# Patient Record
Sex: FEMALE | URBAN - METROPOLITAN AREA
[De-identification: names, ages, dates, MRNs, and addresses within clinical notes are randomized per-mention and may not be internally consistent; named-entity substitution may affect disease eponyms.]

---

## 2022-06-30 ENCOUNTER — OFFICE VISIT (OUTPATIENT)
Dept: URBAN - METROPOLITAN AREA CLINIC 107 | Facility: CLINIC | Age: 47
End: 2022-06-30

## 2022-06-30 NOTE — HPI-TODAY'S VISIT:
47 year old female with a history of HTN is referred by Dr. Micha Kunz for colon cancer screening. A copy of today' visit will be forwarded to the referring provider.